# Patient Record
Sex: FEMALE | Race: BLACK OR AFRICAN AMERICAN | NOT HISPANIC OR LATINO | Employment: FULL TIME | URBAN - METROPOLITAN AREA
[De-identification: names, ages, dates, MRNs, and addresses within clinical notes are randomized per-mention and may not be internally consistent; named-entity substitution may affect disease eponyms.]

---

## 2021-11-21 ENCOUNTER — OFFICE VISIT (OUTPATIENT)
Dept: URGENT CARE | Facility: PHYSICIAN GROUP | Age: 26
End: 2021-11-21
Payer: COMMERCIAL

## 2021-11-21 VITALS
BODY MASS INDEX: 18.52 KG/M2 | HEIGHT: 67 IN | RESPIRATION RATE: 16 BRPM | HEART RATE: 88 BPM | TEMPERATURE: 97.6 F | DIASTOLIC BLOOD PRESSURE: 70 MMHG | SYSTOLIC BLOOD PRESSURE: 118 MMHG | WEIGHT: 118 LBS | OXYGEN SATURATION: 98 %

## 2021-11-21 DIAGNOSIS — K04.7 DENTAL INFECTION: ICD-10-CM

## 2021-11-21 PROCEDURE — 99203 OFFICE O/P NEW LOW 30 MIN: CPT | Performed by: PHYSICIAN ASSISTANT

## 2021-11-21 RX ORDER — AMOXICILLIN 500 MG/1
500 CAPSULE ORAL 2 TIMES DAILY
Qty: 14 CAPSULE | Refills: 0 | Status: SHIPPED | OUTPATIENT
Start: 2021-11-21 | End: 2021-11-28

## 2021-11-21 RX ORDER — LIDOCAINE HYDROCHLORIDE 20 MG/ML
5 SOLUTION OROPHARYNGEAL
Qty: 100 ML | Refills: 0 | Status: SHIPPED | OUTPATIENT
Start: 2021-11-21 | End: 2021-11-26

## 2021-11-21 ASSESSMENT — ENCOUNTER SYMPTOMS
VOMITING: 0
NECK PAIN: 0
NAUSEA: 0
HEADACHES: 0
MYALGIAS: 0
PALPITATIONS: 0
DIZZINESS: 0
ABDOMINAL PAIN: 0
SINUS PAIN: 1
FEVER: 0
COUGH: 0
SORE THROAT: 0
CHILLS: 0

## 2021-11-21 NOTE — PROGRESS NOTES
Subjective:   Love Dukes is a 26 y.o. female who presents for Tooth Ache (tooth pain, left side X 2-3 days )      HPI:  This is a very pleasant 26-year-old female presenting to the clinic with left-sided tooth and facial pain x2 to 3 days.  Patient is currently visiting from out of town.  She is in town for the next 10 days.  Believes she is dealing with a dental infection.  Currently has braces.  No recent adjustments.  Pain is located to the gums of the left upper teeth.  Mild hot and cold sensitivity.  No dental tenderness.  States the pain is progressively worsening to her left maxillary sinus region as well as her left ear.  She has not noticed any abscess formation.  No discharge or drainage.  Denies any fevers, chills or myalgias.  No nausea or vomiting.  Has been taking Tylenol and ibuprofen with minimal relief.    Review of Systems   Constitutional: Negative for chills, fever and malaise/fatigue.   HENT: Positive for ear pain and sinus pain. Negative for sore throat.         Left upper dental pain and gingival swelling   Respiratory: Negative for cough.    Cardiovascular: Negative for chest pain and palpitations.   Gastrointestinal: Negative for abdominal pain, nausea and vomiting.   Musculoskeletal: Negative for myalgias and neck pain.   Skin: Negative for rash.   Neurological: Negative for dizziness and headaches.       Medications:    • amoxicillin Caps  • lidocaine Soln    Allergies: Diphenhydramine hcl and Oxycodone-acetaminophen    Problem List: Love Dukes does not have a problem list on file.    Surgical History:  No past surgical history on file.    Past Social Hx: Love Dukes  reports that she has never smoked. She has never used smokeless tobacco. She reports previous alcohol use. She reports that she does not use drugs.     Past Family Hx:  Love Dukes family history is not on file.     Problem list, medications, and allergies reviewed by myself today in Epic.     Objective:  "    /70   Pulse 88   Temp 36.4 °C (97.6 °F)   Resp 16   Ht 1.702 m (5' 7\")   Wt 53.5 kg (118 lb)   SpO2 98%   BMI 18.48 kg/m²     Physical Exam  Constitutional:       General: She is not in acute distress.     Appearance: Normal appearance. She is not ill-appearing, toxic-appearing or diaphoretic.   HENT:      Head: Normocephalic and atraumatic.      Right Ear: Tympanic membrane, ear canal and external ear normal.      Left Ear: Tympanic membrane, ear canal and external ear normal.      Mouth/Throat:      Mouth: Mucous membranes are moist. Oral lesions present.      Dentition: Gingival swelling present. No dental tenderness, dental caries or dental abscesses.      Pharynx: No pharyngeal swelling, posterior oropharyngeal erythema or uvula swelling.        Comments: Braces present.  Left upper gingival swelling and tenderness.  No dental tenderness.  No signs of abscess formation.  No discharge or drainage.  There was one small ulceration appreciated to the left upper gingiva consistent with possible canker sore.  Eyes:      Conjunctiva/sclera: Conjunctivae normal.   Cardiovascular:      Rate and Rhythm: Normal rate and regular rhythm.      Pulses: Normal pulses.   Pulmonary:      Effort: Pulmonary effort is normal.   Musculoskeletal:      Cervical back: Normal range of motion.   Neurological:      Mental Status: She is alert.           Assessment/Plan:     Comments/MDM:     • Differential diagnoses and treatment options were discussed with the patient clinic today.  Patient has noticed progressively worsening gingival pain and swelling for the last 2 to 3 days.  Pain is progressively spread to her left maxillary sinus and left ear.  On exam she does have moderate gingival swelling.  No signs of abscess formation.  No abnormal dentition.  Was a small 1 mm in size ulceration to the left upper gingiva.  Agreed to start the patient on a course of oral antibiotics for potential dental infection.  Discussed " pain may be caused by her small abscess ulcer in which is viral in etiology and will improve with time.  Supportive care recommendations were discussed.  Encouraged to call with any questions or concerns.  Return for any worsening symptoms.     Diagnosis and associated orders:     1. Dental infection  amoxicillin (AMOXIL) 500 MG Cap    lidocaine (XYLOCAINE) 2 % Solution            Differential diagnosis, natural history, supportive care, and indications for immediate follow-up discussed.    Advised the patient to follow-up with the primary care physician for recheck, reevaluation, and consideration of further management.    Please note that this dictation was created using voice recognition software. I have made reasonable attempt to correct obvious errors, but I expect that there are errors of grammar and possibly content that I did not discover before finalizing the note.    This note was electronically signed by DEBORAH Oliveira PA-C

## 2022-08-27 ENCOUNTER — HOSPITAL ENCOUNTER (EMERGENCY)
Facility: MEDICAL CENTER | Age: 27
End: 2022-08-27
Attending: EMERGENCY MEDICINE
Payer: COMMERCIAL

## 2022-08-27 ENCOUNTER — APPOINTMENT (OUTPATIENT)
Dept: RADIOLOGY | Facility: MEDICAL CENTER | Age: 27
End: 2022-08-27
Attending: EMERGENCY MEDICINE
Payer: COMMERCIAL

## 2022-08-27 VITALS
HEART RATE: 88 BPM | WEIGHT: 130 LBS | OXYGEN SATURATION: 98 % | BODY MASS INDEX: 20.4 KG/M2 | HEIGHT: 67 IN | RESPIRATION RATE: 17 BRPM | TEMPERATURE: 98 F | SYSTOLIC BLOOD PRESSURE: 110 MMHG | DIASTOLIC BLOOD PRESSURE: 75 MMHG
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
## 2022-08-27 DIAGNOSIS — S13.4XXA WHIPLASH INJURY TO NECK, INITIAL ENCOUNTER: ICD-10-CM

## 2022-08-27 LAB
SARS-COV-2 RNA RESP QL NAA+PROBE: NOTDETECTED
SPECIMEN SOURCE: NORMAL

## 2022-08-27 PROCEDURE — 36415 COLL VENOUS BLD VENIPUNCTURE: CPT

## 2022-08-27 PROCEDURE — U0003 INFECTIOUS AGENT DETECTION BY NUCLEIC ACID (DNA OR RNA); SEVERE ACUTE RESPIRATORY SYNDROME CORONAVIRUS 2 (SARS-COV-2) (CORONAVIRUS DISEASE [COVID-19]), AMPLIFIED PROBE TECHNIQUE, MAKING USE OF HIGH THROUGHPUT TECHNOLOGIES AS DESCRIBED BY CMS-2020-01-R: HCPCS

## 2022-08-27 PROCEDURE — 72040 X-RAY EXAM NECK SPINE 2-3 VW: CPT

## 2022-08-27 PROCEDURE — U0005 INFEC AGEN DETEC AMPLI PROBE: HCPCS

## 2022-08-27 PROCEDURE — 307740 HCHG GREEN TRAUMA TEAM SERVICES

## 2022-08-27 PROCEDURE — 99284 EMERGENCY DEPT VISIT MOD MDM: CPT

## 2022-08-27 RX ORDER — NAPROXEN 500 MG/1
500 TABLET ORAL 2 TIMES DAILY WITH MEALS
Qty: 20 TABLET | Refills: 0 | Status: SHIPPED | OUTPATIENT
Start: 2022-08-27

## 2022-08-27 RX ORDER — CYCLOBENZAPRINE HCL 10 MG
10 TABLET ORAL 3 TIMES DAILY PRN
Qty: 20 TABLET | Refills: 0 | Status: SHIPPED | OUTPATIENT
Start: 2022-08-27

## 2022-08-27 NOTE — ED NOTES
..Pt verbalizes understanding of dc instructions. Rx given. Pt states all questions have been answered and they feel comfortable with dc instructions provided. Pt states has all personal belonging. Pt to lobby w/o incident
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
## 2022-10-08 ENCOUNTER — HOSPITAL ENCOUNTER (EMERGENCY)
Facility: MEDICAL CENTER | Age: 27
End: 2022-10-08
Attending: EMERGENCY MEDICINE
Payer: COMMERCIAL

## 2022-10-08 ENCOUNTER — APPOINTMENT (OUTPATIENT)
Dept: RADIOLOGY | Facility: MEDICAL CENTER | Age: 27
End: 2022-10-08
Attending: EMERGENCY MEDICINE
Payer: COMMERCIAL

## 2022-10-08 VITALS
SYSTOLIC BLOOD PRESSURE: 106 MMHG | TEMPERATURE: 98.3 F | DIASTOLIC BLOOD PRESSURE: 67 MMHG | HEART RATE: 82 BPM | BODY MASS INDEX: 18.69 KG/M2 | OXYGEN SATURATION: 98 % | WEIGHT: 119.05 LBS | HEIGHT: 67 IN | RESPIRATION RATE: 18 BRPM

## 2022-10-08 DIAGNOSIS — U07.1 COVID-19 VIRUS INFECTION: ICD-10-CM

## 2022-10-08 DIAGNOSIS — B34.9 VIRAL SYNDROME: ICD-10-CM

## 2022-10-08 LAB
EKG IMPRESSION: NORMAL
FLUAV RNA SPEC QL NAA+PROBE: NEGATIVE
FLUBV RNA SPEC QL NAA+PROBE: NEGATIVE
RSV RNA SPEC QL NAA+PROBE: NEGATIVE
S PYO DNA SPEC NAA+PROBE: NOT DETECTED
SARS-COV-2 RNA RESP QL NAA+PROBE: DETECTED
SPECIMEN SOURCE: ABNORMAL

## 2022-10-08 PROCEDURE — 99284 EMERGENCY DEPT VISIT MOD MDM: CPT

## 2022-10-08 PROCEDURE — C9803 HOPD COVID-19 SPEC COLLECT: HCPCS | Performed by: EMERGENCY MEDICINE

## 2022-10-08 PROCEDURE — 0241U HCHG SARS-COV-2 COVID-19 NFCT DS RESP RNA 4 TRGT MIC: CPT

## 2022-10-08 PROCEDURE — 71045 X-RAY EXAM CHEST 1 VIEW: CPT

## 2022-10-08 PROCEDURE — 93005 ELECTROCARDIOGRAM TRACING: CPT | Performed by: EMERGENCY MEDICINE

## 2022-10-08 PROCEDURE — 87651 STREP A DNA AMP PROBE: CPT

## 2022-10-08 NOTE — ED PROVIDER NOTES
Scribed for Allan López M.D. by Mary Lara. 10/8/2022  1:47 PM    Primary care provider: Pcp Pt States None  Means of arrival: Walk In  History obtained from: Patient  History limited by: None    CHIEF COMPLAINT  Chief Complaint   Patient presents with    Flu Like Symptoms     Symptoms stating midweek with sore throat, productive cough, headache, and chills. Reports in last few days trying Allegra D and motrin with no relief.         HPI  27 y.o. presents to the emergency department with a chief complaint of influenza like illness beginning 3-4 days ago.    Their symptoms include: headache, cough, chest pains, shortness of breath, palpitations, sore throat, myalgias, and cold sweats. They deny fever, nausea, or vomiting.  The following risk factors are present: None    Potential COVID-19 exposure history: NO Exposure history    Patient has tried taking Ibuprofen and Allegra, without alleviation. She was feeling healthy on 10/4/2022 and felt the symptoms came on suddenly the next day. She does not have chronic medical issues and does not take any routine medications. She does not have a history of chest surgeries or implants. She deneis change of pregnancy. She has not been tested for COVID, Flu, or strep throat.     Additionally, patient notes 2-3 brief episodes of shortness of breath with palpitations recently. At the time she felt flushed and mildly anxious. She experienced similar symptoms in college, but they had not recurred until recently. She just moved to [place] 3 months ago, and is not yet established with a PCP.       REVIEW OF SYSTEMS  Pertinent positives include: headache, couch, chest pains, shortness of breath, sore throat, myalgias, and cold sweats. Pertinent negatives include: fever, nausea, or vomiting.. See history of present illness. All other systems are negative.       PAST MEDICAL HISTORY  None noted when reviewed.    SOCIAL HISTORY  Social History     Tobacco Use    Smoking  "status: Never    Smokeless tobacco: Never   Vaping Use    Vaping Use: Never used   Substance and Sexual Activity    Alcohol use: Not Currently    Drug use: Never    Sexual activity: Not on file       SURGICAL HISTORY  patient denies any surgical history    CURRENT MEDICATIONS  Home Medications       Reviewed by Teressa Cha R.N. (Registered Nurse) on 10/08/22 at 1301  Med List Status: Partial     Medication Last Dose Status   cyclobenzaprine (FLEXERIL) 10 mg Tab  Active   naproxen (NAPROSYN) 500 MG Tab  Active                    ALLERGIES  Allergies   Allergen Reactions    Diphenhydramine Hcl     Oxycodone-Acetaminophen        PHYSICAL EXAM  VITAL SIGNS: /66   Pulse (!) 106   Temp 36.5 °C (97.7 °F) (Temporal)   Resp 16   Ht 1.702 m (5' 7\")   Wt 54 kg (119 lb 0.8 oz)   LMP 10/05/2022 (Exact Date)   SpO2 97%   BMI 18.65 kg/m²    Genl: Sitting comfortably, speaking clearly, appears in no acute distress   Head: NC/AT   ENT: Mild posterior oropharyngeal erythema. Mucous membranes moist, uvula midline, nares patent bilaterally   Eyes: Normal sclera, pupils equal round reactive to light  Neck: Supple, FROM, no obvious LAD appreciated   Pulmonary: Lungs are clear to auscultation bilaterally.   Chest: No TTP  CV:  no murmur appreciated,  regular rate vs tachycardic  Abdomen: soft, NT/ND; no rebound/guarding, no masses palpated  : no CVA or suprapubic tenderness   Musculoskeletal: Pain free ROM of the neck. Moving upper and lower extremities and spontaneous in coordinated fashion  Neuro: A&Ox4 (person, place, time, situation), speech fluent, no focal deficits appreciated  Psych: Patient has an appropriate affect and behavior  Skin: No rash or lesions.  No pallor or jaundice.  No cyanosis.  Warm and dry.       COURSE & MEDICAL DECISION MAKING  DX-CHEST-PORTABLE (1 VIEW)   Final Result         1. No acute cardiopulmonary abnormalities are identified.          Labs Reviewed   COV-2, FLU A/B, AND RSV BY PCR " (CEPHEID) - Abnormal; Notable for the following components:       Result Value    SARS-CoV-2 by PCR DETECTED (*)     All other components within normal limits   GROUP A STREP BY PCR       Pertinent Labs & Imaging studies reviewed. (See chart for details)      MDM  I verified that the patient was wearing a mask and I was wearing appropriate PPE during each encounter with patient. The patient's mask was on the patient at all times during my encounter except for a brief view of the oropharynx.    2.)Patient presents with symptoms concerning for influenza like illness as described above. During the intial assessment it is noted that the pt is not in respiratory distress. The constellation of symptoms are suggestive of viral illness with potential pneumonia, sepsis and unique infections due to current community outbreak.    Chest x-ray is obtained and reveals no evidence of pneumonia, pattern consistent with likely interstitial disease/viral etiology.      3:00 PM - I reevaluated the patient at bedside and updated her on her results as outlined above. Strep is negative and EKG is WNL. I discussed the plan for discharge with self-isolation instructions, as outlined below, and gave the patient the opportunity to ask questions. She does not want a work note. She understands and agrees to the plan for discharge.  Patient understands that her COVID test is pending at time of discharge and that she will need to isolate until asymptomatic and if COVID-positive follow current CDC recommendations    They agreed to return for worsening or persistent symptoms, significant or worsening shortness of breath or severe lightheadedness.     DISPOSITION:  Patient will be discharged home in stable condition.    FOLLOW UP:  [facility], Emergency Dept  [address] [address] [phone]    If symptoms worsen    [facility]  [address] [place] [facility] [address]  [phone]      FINAL  IMPRESSION  1. Viral syndrome    2. COVID-19 virus infection          IMary (Scribe), am scribing for, and in the presence of, Allan López M.D..    Electronically signed by: Mary Lara (Scribe), 10/8/2022    Allan BARRIENTOS M.D. personally performed the services described in this documentation, as scribed by Mary Lara in my presence, and it is both accurate and complete.    The note accurately reflects work and decisions made by me.  Allan López M.D.  10/8/2022  9:45 PM

## 2022-10-08 NOTE — ED TRIAGE NOTES
Chief Complaint   Patient presents with    Flu Like Symptoms     Symptoms stating midweek with sore throat, productive cough, headache, and chills. Reports in last few days trying Allegra D and motrin with no relief.

## 2023-04-27 ENCOUNTER — HOSPITAL ENCOUNTER (EMERGENCY)
Facility: MEDICAL CENTER | Age: 28
End: 2023-04-27
Attending: EMERGENCY MEDICINE
Payer: COMMERCIAL

## 2023-04-27 VITALS
OXYGEN SATURATION: 98 % | TEMPERATURE: 99.1 F | HEART RATE: 102 BPM | RESPIRATION RATE: 16 BRPM | HEIGHT: 67 IN | SYSTOLIC BLOOD PRESSURE: 102 MMHG | WEIGHT: 126.76 LBS | DIASTOLIC BLOOD PRESSURE: 56 MMHG | BODY MASS INDEX: 19.9 KG/M2

## 2023-04-27 DIAGNOSIS — J02.9 PHARYNGITIS, UNSPECIFIED ETIOLOGY: ICD-10-CM

## 2023-04-27 PROCEDURE — 700111 HCHG RX REV CODE 636 W/ 250 OVERRIDE (IP): Mod: UD | Performed by: EMERGENCY MEDICINE

## 2023-04-27 PROCEDURE — 87077 CULTURE AEROBIC IDENTIFY: CPT

## 2023-04-27 PROCEDURE — 700102 HCHG RX REV CODE 250 W/ 637 OVERRIDE(OP): Mod: UD | Performed by: EMERGENCY MEDICINE

## 2023-04-27 PROCEDURE — A9270 NON-COVERED ITEM OR SERVICE: HCPCS | Mod: UD | Performed by: EMERGENCY MEDICINE

## 2023-04-27 PROCEDURE — 99283 EMERGENCY DEPT VISIT LOW MDM: CPT

## 2023-04-27 PROCEDURE — 87070 CULTURE OTHR SPECIMN AEROBIC: CPT

## 2023-04-27 RX ORDER — ACETAMINOPHEN 325 MG/1
650 TABLET ORAL ONCE
Status: COMPLETED | OUTPATIENT
Start: 2023-04-27 | End: 2023-04-27

## 2023-04-27 RX ORDER — AMOXICILLIN 500 MG/1
1000 CAPSULE ORAL DAILY
Qty: 20 CAPSULE | Refills: 0 | Status: ACTIVE | OUTPATIENT
Start: 2023-04-27 | End: 2023-05-07

## 2023-04-27 RX ORDER — DEXAMETHASONE SODIUM PHOSPHATE 10 MG/ML
10 INJECTION, SOLUTION INTRAMUSCULAR; INTRAVENOUS ONCE
Status: COMPLETED | OUTPATIENT
Start: 2023-04-27 | End: 2023-04-27

## 2023-04-27 RX ADMIN — ACETAMINOPHEN 650 MG: 325 TABLET, FILM COATED ORAL at 11:23

## 2023-04-27 RX ADMIN — DEXAMETHASONE SODIUM PHOSPHATE 10 MG: 10 INJECTION INTRAMUSCULAR; INTRAVENOUS at 11:23

## 2023-04-27 NOTE — ED PROVIDER NOTES
"ED Provider Note    CHIEF COMPLAINT  Chief Complaint   Patient presents with    Sore Throat     Patient reports sore throat since Tuesday night. Patient reports it is painful for her to swallow. Airway patent at this time.       HPI/ROS      Love Dukes is a 27 y.o. female who presents with a sore throat.  Started 3 days ago.  Is generalized body aches.  Has headache.  Pain radiates into the ears bilaterally.  She is able to swallow but it hurts.  No difficulty breathing, change in voice drooling.  No neck stiffness but her neck is sore.  She has not had cough, congestion, runny nose.  No rash.  No known ill contacts.  She is not pregnant.    PAST MEDICAL HISTORY       SURGICAL HISTORY  patient denies any surgical history    FAMILY HISTORY  No family history on file.    SOCIAL HISTORY  Social History     Tobacco Use    Smoking status: Never    Smokeless tobacco: Never   Vaping Use    Vaping Use: Never used   Substance and Sexual Activity    Alcohol use: Not Currently    Drug use: Never    Sexual activity: Not on file       CURRENT MEDICATIONS  Home Medications       Reviewed by Susan Mitchell R.N. (Registered Nurse) on 04/27/23 at 1033  Med List Status: Partial     Medication Last Dose Status   cyclobenzaprine (FLEXERIL) 10 mg Tab  Active   naproxen (NAPROSYN) 500 MG Tab  Active                    ALLERGIES  Allergies   Allergen Reactions    Diphenhydramine Hcl     Oxycodone-Acetaminophen        PHYSICAL EXAM  VITAL SIGNS: /64   Pulse (!) 109   Temp 37.4 °C (99.4 °F) (Temporal)   Resp 16   Ht 1.702 m (5' 7\")   Wt 57.5 kg (126 lb 12.2 oz)   SpO2 95%   BMI 19.85 kg/m²    Constitutional: Awake and alert  HENT: Tonsillar enlargement bilaterally 2-3+ with overlying exudates.  No asymmetry or uvular shift.  Airway widely patent.  Eyes: Normal inspection  Neck: Normal range of motion without meningismus.  Positive lymphadenopathy  Cardiovascular: Normal heart rate  Thorax & Lungs: No respiratory " distress  Extremities: Well perfused  Neurologic: Grossly normal   Psychiatric: Normal for situation      DIAGNOSTIC STUDIES / PROCEDURES      COURSE & MEDICAL DECISION MAKING    INITIAL ASSESSMENT, COURSE AND PLAN  Care Narrative: Presents with exudative tonsillitis.  She has subjective fevers, lymphadenopathy, headache.  She has no URI symptoms.  Clinically suggest bacterial process.  Ordered throat culture to confirm.  Patient was given dexamethasone orally in the emergency department.  Given Tylenol for fever and body aches.  She is tolerating orals.  Considered CT scan of the soft tissue neck, I do not see any evidence of abscess.  Considered blood work, she is not systemically ill or toxic and there is no evidence of dehydration.  Considered rapid strep screen and discussed this with the patient.  Given the clinical findings we will treat empirically with antibiotics.  Prescribed amoxicillin per hospital protocol.   patient appears appropriate for outpatient management.  I advised push fluids, Tylenol and ibuprofen.  I have given a prescription for amoxicillin.  She will return to the ER for worsening, not improving, difficulty breathing or concern.      DISPOSITION AND DISCUSSIONS    Escalation of care considered, and ultimately not performed:IV fluids, blood analysis, and diagnostic imaging    Barriers to care at this time, including but not limited to: Patient does not have established PCP.     Decision tools and prescription drugs considered including, but not limited to: Prescribed amoxicillin.  Considered prescription analgesics, but nonnarcotics most appropriate.    FINAL DIAGNOSIS  1. Pharyngitis, unspecified etiology           Electronically signed by: Bakari Krause M.D., 4/27/2023 11:10 AM

## 2023-04-27 NOTE — ED TRIAGE NOTES
"Chief Complaint   Patient presents with    Sore Throat     Patient reports sore throat since Tuesday night. Patient reports it is painful for her to swallow. Airway patent at this time.       26 yo female to triage for above complaint.     Pt is alert and oriented, speaking in full sentences, follows commands and responds appropriately to questions.     Patient placed back in lobby and educated on triage process. Asked to inform RN of any changes.    /69   Pulse (!) 120   Temp 37.4 °C (99.4 °F) (Temporal)   Resp 16   Ht 1.702 m (5' 7\")   Wt 57.5 kg (126 lb 12.2 oz)   SpO2 95%   BMI 19.85 kg/m²     "

## 2023-04-27 NOTE — LETTER
5/2/2023               Love Dukes  [address]  [facility] [address]        Dear Love (MR#[id number])    This letter is sent in regards to your recent visit to the [facility] Emergency Department on 4/27/2023. During the visit, tests were performed to assist the physician in your medical diagnosis. A review of your tests requires that we notify you of the following:    Your throat culture was POSITIVE for a bacteria called Group A Streptococcus. The antibiotic prescribed for you (amoxicillin) should be active to treat this bacteria. It is important that you continue taking your antibiotic until it is finished.     Please feel free to contact me at the number below if you have any questions or concerns. Thank you for your cooperation in the matter.    Sincerely,  ED Culture Follow-Up Staff  Ileana Denney, PharmD  [phone]    [facility] Emergency Department  [address] [place] [id number] [phone] (ED Culture Line)

## 2023-04-29 LAB
BACTERIA SPEC RESP CULT: ABNORMAL
BACTERIA SPEC RESP CULT: ABNORMAL
SIGNIFICANT IND 70042: ABNORMAL
SITE SITE: ABNORMAL
SOURCE SOURCE: ABNORMAL

## 2023-05-03 NOTE — ED NOTES
"ED Positive Culture Follow-up/Notification Note:    Date: 5/2/2023     Patient seen in the ED on 4/27/2023 for sore throat with painful swallowing for 3 days. Patient also reports body aches, headache, and pain in the ears. Physical exam Revealed lymphadenopathy.  Patient reports no ill contacts and no cough, runny nose, or congestion.    1. Pharyngitis, unspecified etiology       Discharge Medication List as of 4/27/2023 11:30 AM        START taking these medications    Details   amoxicillin (AMOXIL) 500 MG Cap Take 2 Capsules by mouth every day for 10 days., Disp-20 Capsule, R-0, Normal             Allergies: Diphenhydramine hcl and Oxycodone-acetaminophen     Vitals:    04/27/23 1024 04/27/23 1032 04/27/23 1052 04/27/23 1101   BP: 110/69  106/64 102/56   Pulse: (!) 120  (!) 109 (!) 102   Resp: 16  16 16   Temp: 37.4 °C (99.4 °F)   37.3 °C (99.1 °F)   TempSrc: Temporal   Temporal   SpO2: 95%  95% 98%   Weight:  57.5 kg (126 lb 12.2 oz)     Height: 1.702 m (5' 7\")          Final cultures:   Results       Procedure Component Value Units Date/Time    CULTURE THROAT [[id number]]  (Abnormal) Collected: 04/27/23 1125    Order Status: Completed Specimen: Throat Updated: 04/29/23 1145     Significant Indicator POS     Source THRT     Site THROAT     Culture Result Heavy growth usual upper respiratory juani      Beta Hemolytic Streptococcus group A  Rare growth              Plan:   Appropriate antibiotic therapy prescribed for group A streptococcus in the throat. No changes in therapy based on result  Attempted to reach patient over phone, however unable to. Left voicemail for patient to return call. Sent letter to patient to notify of positive culture result and encourage compliance with prescribed antibiotics.     Ileana Neri, PharmD   PGY1 Pharmacy Practice Resident    "

## 2024-11-16 ENCOUNTER — APPOINTMENT (OUTPATIENT)
Dept: RADIOLOGY | Facility: MEDICAL CENTER | Age: 29
End: 2024-11-16
Attending: EMERGENCY MEDICINE
Payer: OTHER MISCELLANEOUS

## 2024-11-16 ENCOUNTER — HOSPITAL ENCOUNTER (EMERGENCY)
Facility: MEDICAL CENTER | Age: 29
End: 2024-11-16
Attending: EMERGENCY MEDICINE
Payer: OTHER MISCELLANEOUS

## 2024-11-16 VITALS
HEIGHT: 67 IN | HEART RATE: 63 BPM | BODY MASS INDEX: 19.93 KG/M2 | TEMPERATURE: 97.3 F | DIASTOLIC BLOOD PRESSURE: 69 MMHG | RESPIRATION RATE: 18 BRPM | SYSTOLIC BLOOD PRESSURE: 118 MMHG | OXYGEN SATURATION: 100 % | WEIGHT: 126.98 LBS

## 2024-11-16 DIAGNOSIS — S49.92XA INJURY OF LEFT SHOULDER, INITIAL ENCOUNTER: ICD-10-CM

## 2024-11-16 DIAGNOSIS — R53.83 FATIGUE, UNSPECIFIED TYPE: ICD-10-CM
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
## 2024-11-16 DIAGNOSIS — M54.50 ACUTE MIDLINE LOW BACK PAIN WITHOUT SCIATICA: ICD-10-CM

## 2024-11-16 LAB
ALBUMIN SERPL BCP-MCNC: 3.7 G/DL (ref 3.2–4.9)
ALBUMIN/GLOB SERPL: 1 G/DL
ALP SERPL-CCNC: 63 U/L (ref 30–99)
ALT SERPL-CCNC: 9 U/L (ref 2–50)
ANION GAP SERPL CALC-SCNC: 9 MMOL/L (ref 7–16)
AST SERPL-CCNC: 15 U/L (ref 12–45)
BASOPHILS # BLD AUTO: 0.4 % (ref 0–1.8)
BASOPHILS # BLD: 0.02 K/UL (ref 0–0.12)
BILIRUB SERPL-MCNC: 0.3 MG/DL (ref 0.1–1.5)
BUN SERPL-MCNC: 11 MG/DL (ref 8–22)
CALCIUM ALBUM COR SERPL-MCNC: 9.4 MG/DL (ref 8.5–10.5)
CALCIUM SERPL-MCNC: 9.2 MG/DL (ref 8.5–10.5)
CHLORIDE SERPL-SCNC: 105 MMOL/L (ref 96–112)
CO2 SERPL-SCNC: 22 MMOL/L (ref 20–33)
CREAT SERPL-MCNC: 0.75 MG/DL (ref 0.5–1.4)
EOSINOPHIL # BLD AUTO: 0.14 K/UL (ref 0–0.51)
EOSINOPHIL NFR BLD: 2.7 % (ref 0–6.9)
ERYTHROCYTE [DISTWIDTH] IN BLOOD BY AUTOMATED COUNT: 42.2 FL (ref 35.9–50)
EST. AVERAGE GLUCOSE BLD GHB EST-MCNC: 103 MG/DL
GFR SERPLBLD CREATININE-BSD FMLA CKD-EPI: 109 ML/MIN/1.73 M 2
GLOBULIN SER CALC-MCNC: 3.8 G/DL (ref 1.9–3.5)
GLUCOSE SERPL-MCNC: 82 MG/DL (ref 65–99)
HBA1C MFR BLD: 5.2 % (ref 4–5.6)
HCG SERPL QL: NEGATIVE
HCT VFR BLD AUTO: 43.6 % (ref 37–47)
HGB BLD-MCNC: 14.7 G/DL (ref 12–16)
IMM GRANULOCYTES # BLD AUTO: 0.02 K/UL (ref 0–0.11)
IMM GRANULOCYTES NFR BLD AUTO: 0.4 % (ref 0–0.9)
LYMPHOCYTES # BLD AUTO: 1.53 K/UL (ref 1–4.8)
LYMPHOCYTES NFR BLD: 29.2 % (ref 22–41)
MCH RBC QN AUTO: 31.5 PG (ref 27–33)
MCHC RBC AUTO-ENTMCNC: 33.7 G/DL (ref 32.2–35.5)
MCV RBC AUTO: 93.4 FL (ref 81.4–97.8)
MONOCYTES # BLD AUTO: 0.5 K/UL (ref 0–0.85)
MONOCYTES NFR BLD AUTO: 9.5 % (ref 0–13.4)
NEUTROPHILS # BLD AUTO: 3.03 K/UL (ref 1.82–7.42)
NEUTROPHILS NFR BLD: 57.8 % (ref 44–72)
NRBC # BLD AUTO: 0 K/UL
NRBC BLD-RTO: 0 /100 WBC (ref 0–0.2)
PLATELET # BLD AUTO: 258 K/UL (ref 164–446)
PMV BLD AUTO: 8 FL (ref 9–12.9)
POTASSIUM SERPL-SCNC: 4.1 MMOL/L (ref 3.6–5.5)
PROT SERPL-MCNC: 7.5 G/DL (ref 6–8.2)
RBC # BLD AUTO: 4.67 M/UL (ref 4.2–5.4)
SODIUM SERPL-SCNC: 136 MMOL/L (ref 135–145)
TSH SERPL DL<=0.005 MIU/L-ACNC: 0.9 UIU/ML (ref 0.38–5.33)
WBC # BLD AUTO: 5.2 K/UL (ref 4.8–10.8)

## 2024-11-16 PROCEDURE — 99284 EMERGENCY DEPT VISIT MOD MDM: CPT

## 2024-11-16 PROCEDURE — 85025 COMPLETE CBC W/AUTO DIFF WBC: CPT

## 2024-11-16 PROCEDURE — 84443 ASSAY THYROID STIM HORMONE: CPT

## 2024-11-16 PROCEDURE — 84703 CHORIONIC GONADOTROPIN ASSAY: CPT

## 2024-11-16 PROCEDURE — 73030 X-RAY EXAM OF SHOULDER: CPT | Mod: LT

## 2024-11-16 PROCEDURE — 36415 COLL VENOUS BLD VENIPUNCTURE: CPT

## 2024-11-16 PROCEDURE — 83036 HEMOGLOBIN GLYCOSYLATED A1C: CPT

## 2024-11-16 PROCEDURE — 80053 COMPREHEN METABOLIC PANEL: CPT

## 2024-11-16 RX ORDER — IBUPROFEN 600 MG/1
600 TABLET, FILM COATED ORAL EVERY 6 HOURS PRN
Qty: 20 TABLET | Refills: 0 | Status: SHIPPED | OUTPATIENT
Start: 2024-11-16

## 2024-11-16 RX ORDER — METHOCARBAMOL 750 MG/1
750 TABLET, FILM COATED ORAL 3 TIMES DAILY PRN
Qty: 20 TABLET | Refills: 0 | Status: SHIPPED | OUTPATIENT
Start: 2024-11-16

## 2024-11-16 ASSESSMENT — LIFESTYLE VARIABLES
TOTAL SCORE: 0
HAVE PEOPLE ANNOYED YOU BY CRITICIZING YOUR DRINKING: NO
DO YOU DRINK ALCOHOL: NO
TOTAL SCORE: 0
CONSUMPTION TOTAL: INCOMPLETE
EVER HAD A DRINK FIRST THING IN THE MORNING TO STEADY YOUR NERVES TO GET RID OF A HANGOVER: NO
HAVE YOU EVER FELT YOU SHOULD CUT DOWN ON YOUR DRINKING: NO
EVER FELT BAD OR GUILTY ABOUT YOUR DRINKING: NO
TOTAL SCORE: 0

## 2024-11-16 ASSESSMENT — PAIN DESCRIPTION - PAIN TYPE: TYPE: ACUTE PAIN
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
## 2024-11-16 NOTE — ED NOTES
Pt discharged home and to self. She ambulated with a steady gait to the exit. Patient expressed understanding of her discharge instructions. Aox4, no complaints. Educated to return to ED if symptoms worsten
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
## 2024-11-18 ENCOUNTER — PATIENT OUTREACH (OUTPATIENT)
Dept: SCHEDULING | Facility: IMAGING CENTER | Age: 29
End: 2024-11-18
Payer: MEDICAID

## 2024-11-19 ENCOUNTER — PATIENT OUTREACH (OUTPATIENT)
Dept: SCHEDULING | Facility: IMAGING CENTER | Age: 29
End: 2024-11-19
Payer: MEDICAID

## 2024-11-20 ENCOUNTER — PATIENT OUTREACH (OUTPATIENT)
Dept: SCHEDULING | Facility: IMAGING CENTER | Age: 29
End: 2024-11-20
Payer: MEDICAID